# Patient Record
Sex: MALE | Race: WHITE | Employment: OTHER | URBAN - METROPOLITAN AREA
[De-identification: names, ages, dates, MRNs, and addresses within clinical notes are randomized per-mention and may not be internally consistent; named-entity substitution may affect disease eponyms.]

---

## 2019-04-23 ENCOUNTER — APPOINTMENT (OUTPATIENT)
Dept: GENERAL RADIOLOGY | Age: 84
End: 2019-04-23
Attending: FAMILY MEDICINE
Payer: MEDICARE

## 2019-04-23 ENCOUNTER — HOSPITAL ENCOUNTER (OUTPATIENT)
Age: 84
Discharge: HOME | End: 2019-04-23
Attending: FAMILY MEDICINE
Payer: MEDICARE

## 2019-04-23 VITALS
WEIGHT: 185 LBS | RESPIRATION RATE: 17 BRPM | OXYGEN SATURATION: 97 % | HEART RATE: 88 BPM | SYSTOLIC BLOOD PRESSURE: 145 MMHG | HEIGHT: 71 IN | DIASTOLIC BLOOD PRESSURE: 85 MMHG | BODY MASS INDEX: 25.9 KG/M2 | TEMPERATURE: 98 F

## 2019-04-23 DIAGNOSIS — S52.532A CLOSED COLLES' FRACTURE OF LEFT RADIUS, INITIAL ENCOUNTER: Primary | ICD-10-CM

## 2019-04-23 PROCEDURE — 73110 X-RAY EXAM OF WRIST: CPT | Performed by: FAMILY MEDICINE

## 2019-04-23 PROCEDURE — 29125 APPL SHORT ARM SPLINT STATIC: CPT

## 2019-04-23 PROCEDURE — 99204 OFFICE O/P NEW MOD 45 MIN: CPT

## 2019-04-23 RX ORDER — ACETAMINOPHEN AND CODEINE PHOSPHATE 300; 30 MG/1; MG/1
1-2 TABLET ORAL EVERY 6 HOURS PRN
Qty: 15 TABLET | Refills: 0 | Status: SHIPPED | OUTPATIENT
Start: 2019-04-23 | End: 2019-04-30

## 2019-04-23 RX ORDER — BUDESONIDE AND FORMOTEROL FUMARATE DIHYDRATE 80; 4.5 UG/1; UG/1
AEROSOL RESPIRATORY (INHALATION)
COMMUNITY
Start: 2019-04-13

## 2019-04-23 RX ORDER — METOPROLOL SUCCINATE 25 MG/1
TABLET, EXTENDED RELEASE ORAL
COMMUNITY
Start: 2019-03-28

## 2019-04-23 RX ORDER — TAMSULOSIN HYDROCHLORIDE 0.4 MG/1
CAPSULE ORAL
COMMUNITY
Start: 2019-03-28

## 2019-04-23 RX ORDER — WARFARIN SODIUM 5 MG/1
TABLET ORAL
COMMUNITY
Start: 2019-04-01

## 2019-04-23 RX ORDER — FINASTERIDE 5 MG/1
TABLET, FILM COATED ORAL
COMMUNITY
Start: 2019-04-16

## 2019-04-23 NOTE — ED NOTES
Ice pack applied to left wrist.  Left wrist is sore and swollen. He is able to move it. Radial pulses is intact.  Light colored blue bruising noted to his inner wrist.

## 2019-04-23 NOTE — ED PROVIDER NOTES
Pt seen in [address]      Stated Complaint: Patient presents with:  Upper Extremity Injury (musculoskeletal)      --------------   RN assessment:     L wrist pain p fall  --------------    CC:  L Wrist pain    HPI:  Nataly Key member of club or organization: Not on file        Attends meetings of clubs or organizations: Not on file        Relationship status: Not on file      Intimate partner violence:        Fear of current or ex partner: Not on file        Emotionally abused: Symmetric, no curvature, ROM normal, no CVA tenderness   Abdomen:     Soft, non-tender, bowel sounds active all four quadrants,     no masses, no organomegaly   Extremities:    L Wrist:  Edema, ecchymosis, tenderness to wrist distally; no snuff box pain; r